# Patient Record
Sex: MALE | Race: WHITE | NOT HISPANIC OR LATINO | URBAN - METROPOLITAN AREA
[De-identification: names, ages, dates, MRNs, and addresses within clinical notes are randomized per-mention and may not be internally consistent; named-entity substitution may affect disease eponyms.]

---

## 2018-10-19 ENCOUNTER — OFFICE VISIT CONVERTED (OUTPATIENT)
Dept: GASTROENTEROLOGY | Facility: HOSPITAL | Age: 33
End: 2018-10-19
Attending: NURSE PRACTITIONER

## 2018-12-14 ENCOUNTER — OFFICE VISIT CONVERTED (OUTPATIENT)
Dept: GASTROENTEROLOGY | Facility: HOSPITAL | Age: 33
End: 2018-12-14
Attending: NURSE PRACTITIONER

## 2021-05-28 VITALS
SYSTOLIC BLOOD PRESSURE: 113 MMHG | DIASTOLIC BLOOD PRESSURE: 68 MMHG | HEIGHT: 74 IN | DIASTOLIC BLOOD PRESSURE: 70 MMHG | BODY MASS INDEX: 25.67 KG/M2 | SYSTOLIC BLOOD PRESSURE: 118 MMHG | HEIGHT: 74 IN | WEIGHT: 195 LBS | BODY MASS INDEX: 25.03 KG/M2 | WEIGHT: 200 LBS

## 2021-05-28 NOTE — PROGRESS NOTES
Patient: RAFIQ DE DIOS     Acct: [id number]     Report: #[id number]  UNIT #: [id number]     : 1985    Encounter Date:10/19/2018  PRIMARY CARE: CELENA KIM  ***Signed***  --------------------------------------------------------------------------------------------------------------------  DATE: 10/19/18      Chief Complaint      HEPATITIS C W/O HEPATIC COMA            Allergies      Coded Allergies:             NO KNOWN ALLERGIES (Unverified , 10/19/18)            Medications      Last Reconciled on 10/19/18 14:11 by JOE KRUEGER      Loratadine (Claritin) 10 Mg Tablet      10 MG PO QDAY, #30 TAB 0 Refills         Reported         10/19/18       Omeprazole (PriLOSEC*) 20 Mg Capsule.dr      20 MG PO QDAY, #30 CAP 0 Refills         Reported         10/19/18       buPROPion HCl XL (Wellbutrin XL) 150 Mg Tab.er.24h      150 MG PO QDAY for 30 Days, #30 TAB.ER.24H         Reported         10/19/18            Vitals      Height 6 ft 2.00 in / 187.96 cm      Weight 200 lbs  / 90.08434 kg      BSA 2.17 m2      BMI 25.7 kg/m2      Blood Pressure 113/68            Yes: Hx Surgeries (RIGHT FINGER PINS AND SCREWS)      Diabetes - Family Hx:  Father, Grandparent (PATERNAL GRANDMOTHER)      Cancer/Type - Family Hx:  Mother      Social History:  Tobacco Use, Recreational Drug use (FORMER)      Smoking status:  Current every day smoker      Smoking history:  10-25 pack years      Counseling given:  Patient declined      Substance use:  Injection drugs, Other (NASAL, INJECTION, PILLS)      Medical History:  Yes: Hx Hypertension (HCV), Hx Liver Disease (HCV)      Psychiatric History:  Yes: Hx Depression            PREVENTION      Hx Influenza Vaccination:  No      Influenza Vaccine Declined:  Yes      Hx Pneumococcal Vaccination:  No      Chart initiated by      JAS            General:  No Fatigue, No Weight Loss      HEENT:  No Dysphagia, No Visual Changes      Respiratory:  No Cough, No  Dyspnea      Cardiology:  No Chest Pain, No Palpitations      Gastrointestinal:  No Diarrhea, No Constipation      Genitourinary:  No Dysuria, No Frequency      Musculoskeletal:  No Joint Tenderness, No Joint Stiffness      Endocrine:  No Cold Intolerance, No Fatigue      Hematologic:  No Bleeding, No Bruising      Psychologic:  No Anxiety, No Depression      Neurologic:  No Confusion, No Weakness      Skin:  No Rash, No Open Wounds            Mr. Tanner presents for evaluation and treatment of chronic hepatitis C.  He    reports that he was diagnosed about 1 year ago in [facility].  He states that test was    done at the [facility].  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  He reports being clean for the past 3 1/2 months.  Denies     previous treatment of HCV.  He denies unprofessional tattoos and previous blood     transfusions.  Denies ETOH use.            HEENT:  Atraumatic; No Scleral Icterus      Lungs:  CTAB, Breathing is unlabored      Abdomen:  Normal BS all 4 Quadrants, Soft      Cardiovascular:  Regular Rate and Rhythm; No Murmur      Constitutional:  Healthy appearing; No Acute Distress      Neurological:  Mental Status WNL, Alert+Ox3      Musculoskeletal:  Normal Bulk Strength, Normal Tone      Skin:  No Rash, No Swelling      Rectal:  Deferred            Estefanía Stiffness Consistent with:  F0-F1      CAP Score:  Normal/Mild Liver Fat            Current Plan      Obtain labs today.  Schedule for US of RUQ.  Treatment plan to be determined     based on results.      Chronic hepatitis C         Chronic hepatitis C without hepatic coma         Hepatic coma status: without hepatic coma            Notes      New Medications      * buPROPion HCl XL (Wellbutrin XL) 150 MG TAB.ER.24H: 150 MG PO QDAY 30 Days #30      * OMEPRAZOLE (PriLOSEC*) 20 MG CAPSULE.DR: 20 MG PO QDAY #30      * Loratadine (Claritin) 10 MG TABLET: 10 MG PO QDAY #30      New Diagnostics      * Acute Hepatitis  Pane, Stat         Dx: Chronic hepatitis C - B18.2      * HCV RNA QUANTITATIVE HCPCR, Stat         Dx: Chronic hepatitis C - B18.2      * HEPATITIS C GENOTYPE PCR HEPCT, Stat         Dx: Chronic hepatitis C - B18.2      * Hepatitis B Core Ant, Stat         Dx: Chronic hepatitis C - B18.2      * Alcohol Blood/Ethano, Stat         Dx: Chronic hepatitis C - B18.2      * CBC, Stat         Dx: Chronic hepatitis C - B18.2      * Comp Metabolic Panel, Stat         Dx: Chronic hepatitis C - B18.2      * Fibrosure Hcv, Stat         Dx: Chronic hepatitis C - B18.2      * Hiv 1 By Eia W/West , Stat         Dx: Chronic hepatitis C - B18.2      * Drug Screen Serum (9, Stat         Dx: Chronic hepatitis C - B18.2      * PT / INR, Stat         Dx: Chronic hepatitis C - B18.2      * US ABDOMEN LIMITED, SCHEDULED PROCEDURE         Dx: Chronic hepatitis C - B18.2      Patient Education Provided:  Yes      Patient Instructions:  Avoid Alcohol, Avoid Illicit Drug Use, Importance of     keeping appointments      Disposition:  F/U 4 weeks                 Disclaimer: Converted document may not contain table formatting or lab diagrams. Please see [employer] [facility] for the authenticated document.

## 2021-05-28 NOTE — PROGRESS NOTES
Patient: RAFIQ DE DIOS     Acct: [id number]     Report: #[id number]  UNIT #: [id number]     : 1985    Encounter Date:2018  PRIMARY CARE: CELENA KIM  ***Signed***  --------------------------------------------------------------------------------------------------------------------  DATE: 18      Chief Complaint      HEPATITIS C W/O HEPATIC COMA            Allergies      Coded Allergies:             NO KNOWN ALLERGIES (Unverified , 10/19/18)            Medications      Last Reconciled on 18 09:17 by LINDA KRUEGER      Glecaprevir/Pibrentasvir (Mavyret 100-40 mg Tablet) 1 Each Tablet      3 TAB PO QDAY, #84 TAB 1 Refill         Prov: Linda Kaba cpx         18       Loratadine (Claritin) 10 Mg Tablet      10 MG PO QDAY, #30 TAB 0 Refills         Reported         10/19/18       Omeprazole (PriLOSEC*) 20 Mg Capsule.dr      20 MG PO QDAY, #30 CAP 0 Refills         Reported         10/19/18       buPROPion HCl XL (Wellbutrin XL) 150 Mg Tab.er.24h      150 MG PO QDAY for 30 Days, #30 TAB.ER.24H         Reported         10/19/18            Vitals      Height 74 in / 187.96 cm      Weight 195 lbs  / 88.014867 kg      BSA 2.15 m2      BMI 25.0 kg/m2      Blood Pressure 118/70 Sitting            Yes: Hx Surgeries (RIGHT FINGER PINS AND SCREWS)      Diabetes - Family Hx:  Father, Grandparent (PATERNAL GRANDMOTHER)      Cancer/Type - Family Hx:  Mother      Social History:  Tobacco Use, Recreational Drug use (FORMER)      Smoking status:  Current every day smoker      Smoking history:  10-25 pack years      Counseling given:  Patient declined      Substance use:  Injection drugs, Other (NASAL, INJECTION, PILLS)      Medical History:  Yes: Hx Hypertension (HCV), Hx Liver Disease (HCV)      Psychiatric History:  Yes: Hx Depression            PREVENTION      Hx Influenza Vaccination:  No      Influenza Vaccine Declined:  Yes      Hx Pneumococcal Vaccination:  No             General:  No Fatigue, No Weight Loss      HEENT:  No Dysphagia, No Visual Changes      Respiratory:  No Cough, No Dyspnea      Cardiology:  No Chest Pain, No Palpitations      Gastrointestinal:  No Diarrhea, No Constipation      Genitourinary:  No Dysuria, No Frequency      Musculoskeletal:  No Joint Tenderness, No Joint Stiffness      Endocrine:  No Cold Intolerance, No Fatigue      Hematologic:  No Bleeding, No Bruising      Psychologic:  No Anxiety, No Depression      Neurologic:  No Confusion, No Weakness      Skin:  No Rash, No Open Wounds            Mr. Tanner presents for f/u of chronic HCV, GT 1A, F0/F1.  He has completed     4 weeks of treatment with Mavyret.  He reports that he's doing well with     medication regimen.  Denies any current side effects.            HEENT:  Atraumatic; No Scleral Icterus      Lungs:  CTAB, Breathing is unlabored      Abdomen:  Normal BS all 4 Quadrants, Soft      Cardiovascular:  Regular Rate and Rhythm; No Murmur      Constitutional:  Healthy appearing; No Acute Distress      Neurological:  Mental Status WNL, Alert+Ox3      Musculoskeletal:  Normal Bulk Strength, Normal Tone      Skin:  No Rash, No Swelling      Rectal:  Deferred            Lab Results      10/19/2018 CBC: Hemoglobin 16.6, hematocrit 46.4, platelets 230.      CMP: Alk phos 85, AST 31, ALT 32, total bilirubin 0.8.      INR 0.99.      HCV quant 1,500,000.  Hep B core-negative, hepatitis A antibody-negative,     hepatitis B surface antigen-negative.  HCV genotype-1A, Fibrosure-F0, A0, HIV-    negative            Radiology Impressions      11/6/2018 right upper quadrant ultrasound-normal exam.            Estefanía Stiffness Consistent with:  F0-F1      CAP Score:  Normal/Mild Liver Fat            Current Plan      Obtain labs today to assure response to current treatment.  Continue Mavyret for    a total of 8 weeks.      Chronic hepatitis C         Chronic hepatitis C without hepatic coma          Hepatic coma status: without hepatic coma            Notes      New Diagnostics      * CBC, Stat         Dx: Chronic hepatitis C - B18.2      * Comp Metabolic Panel, Stat         Dx: Chronic hepatitis C - B18.2      * HCV RNA QUANTITATIVE HCPCR, Stat         Dx: Chronic hepatitis C - B18.2      * PT / INR, Stat         Dx: Chronic hepatitis C - B18.2      Patient Education Provided:  Yes      Patient Instructions:  Avoid Alcohol, Avoid Illicit Drug Use, Importance of     keeping appointments      Disposition:  F/U 4 weeks                 Disclaimer: Converted document may not contain table formatting or lab diagrams. Please see [employer] [facility] for the authenticated document.